# Patient Record
Sex: MALE | URBAN - METROPOLITAN AREA
[De-identification: names, ages, dates, MRNs, and addresses within clinical notes are randomized per-mention and may not be internally consistent; named-entity substitution may affect disease eponyms.]

---

## 2019-12-09 ENCOUNTER — APPOINTMENT (RX ONLY)
Dept: URBAN - METROPOLITAN AREA CLINIC 18 | Facility: CLINIC | Age: 44
Setting detail: DERMATOLOGY
End: 2019-12-09

## 2019-12-09 PROBLEM — Z41.8 ENCOUNTER FOR OTHER PROCEDURES FOR PURPOSES OTHER THAN REMEDYING HEALTH STATE: Status: ACTIVE | Noted: 2019-12-09

## 2019-12-09 PROCEDURE — ? MEDICAL CONSULTATION: COOLSCULPTING

## 2019-12-18 ENCOUNTER — APPOINTMENT (RX ONLY)
Dept: URBAN - METROPOLITAN AREA CLINIC 18 | Facility: CLINIC | Age: 44
Setting detail: DERMATOLOGY
End: 2019-12-18

## 2019-12-18 PROBLEM — Z41.8 ENCOUNTER FOR OTHER PROCEDURES FOR PURPOSES OTHER THAN REMEDYING HEALTH STATE: Status: ACTIVE | Noted: 2019-12-18

## 2019-12-18 PROCEDURE — ? COOLSCULPTING

## 2019-12-18 NOTE — PROCEDURE: COOLSCULPTING
Time (Minutes - Will Only Render If Nonzero): 35
Suction Settings: The suction settings were per protocol.
Device: Coolsculpting
Location 1: lower abdomen (left)
Intro: Prior to treatment, the area was cleaned with alcohol and marked out with a marking pen. The gel sheet was then applied uniformly. The applicator was applied to the skin with good contact and suction.
Suction Settings: The suction settings were per protocol. 60-75-60
Applicator Size: CoolMini applicator
Applicator Size: CoolAdvantage Core
Location 2: lower abdomen (right)
Consent: Written consent obtained, risks reviewed including, but not limited to, blistering from suction, darker or lighter pigmentary change, bruising, and/or need for multiple treatments.
Applicator Size: CoolAdvantage Curve Plus
Time (Minutes - Will Only Render If Nonzero): 45
Post Treatment: After treatment, the suction was turned off, and the applicator was removed from the skin.
Location 4: upper abdomen (right)
Location 3: upper abdomen (left)
Detail Level: Zone
Price (Use Numbers Only, No Special Characters Or $): 5023.52
Location 6: flank (right)
Location 5: flank (left)

## 2020-01-13 NOTE — HPI: COSMETIC (COOLSCULPTING)
Have You Had Previous Coolsculpting Treatments Before?: has not had previous treatments DISPLAY PLAN FREE TEXT

## 2020-01-20 ENCOUNTER — APPOINTMENT (RX ONLY)
Dept: URBAN - METROPOLITAN AREA CLINIC 153 | Facility: CLINIC | Age: 45
Setting detail: DERMATOLOGY
End: 2020-01-20

## 2020-01-20 PROBLEM — Z41.8 ENCOUNTER FOR OTHER PROCEDURES FOR PURPOSES OTHER THAN REMEDYING HEALTH STATE: Status: ACTIVE | Noted: 2020-01-20

## 2020-01-20 PROCEDURE — 99211 OFF/OP EST MAY X REQ PHY/QHP: CPT

## 2020-01-20 PROCEDURE — ? MEDICAL CONSULTATION: COOLSCULPTING
